# Patient Record
(demographics unavailable — no encounter records)

---

## 2024-11-05 NOTE — HEALTH RISK ASSESSMENT
[Good] : ~his/her~  mood as  good [No] : No [No falls in past year] : Patient reported no falls in the past year [0] : 2) Feeling down, depressed, or hopeless: Not at all (0) [PHQ-2 Negative - No further assessment needed] : PHQ-2 Negative - No further assessment needed [[id number]] : 0 [Never] : Never

## 2024-11-05 NOTE — HISTORY OF PRESENT ILLNESS
[de-identified] : This is annual Preventative examination for this 51 year  old female.  The patient has been generally feeling well with no new complaints  A complete evaluation of their current medication was reviewed with them including OTC medication .  Chronic medical problems for which they are being followed by a physician are:     NONE  Exercises regularly:  A complete Review of Systems is below but it is noteworthy to mention that this patient denies Chest Pain, Dyspnea on Exertion, Palpitations, urinary problems, rectal bleeding or Gastrointestinal problems at this time.

## 2024-11-05 NOTE — ASSESSMENT
[FreeTextEntry1] : This is a 51 year -old  F who was examined today for an annual preventative physical.  A complete history and physical examination were performed.  The patient seems to follow a healthy lifestyle in that she  follows a good diet and exercises regularly.    Physical examination was entirely within normal limits , including a normal blood pressure and pulse.     The following recommendations were made: Exercise regularly. Maintain a healthy diet. _____________________________________________________

## 2025-05-22 NOTE — PHYSICAL EXAM
[Soft] : abdomen soft [Non Tender] : non-tender [Non-distended] : non-distended [No HSM] : no HSM [Normal Bowel Sounds] : normal bowel sounds [No Hernias] : no hernias [Normal] : soft, non-tender, non-distended, no masses palpated, no HSM and normal bowel sounds

## 2025-05-22 NOTE — ASSESSMENT
[FreeTextEntry1] : This is a 52-year-old patient who is well until her travel to [facility] 3 to 4 weeks ago and recently developed watery diarrhea without any abdominal pains fever or rectal bleeding.  She denies any recent antibiotic use.  Her blood pressure and physical examination were normal there is no evidence of abdominal tenderness guarding or rebound.  She did have hyperactive bowel sounds.  My impression is that she probably has a travelers related diarrhea secondary to the Campylobacter possibly Shigella or Salmonella.  Bloods were obtained to check her electrolytes.  In addition I advised her to collect stools for C. difficile, C culture and sensitivity and parasites.  I advised that she take half an Imodium twice daily and go on a brat diet and call if she develops any fever chills rectal bleeding or fevers

## 2025-05-22 NOTE — HISTORY OF PRESENT ILLNESS
[FreeTextEntry8] : This is a 52-year-old patient who is complaining of significant watery diarrhea for 3 days duration.  The patient admits to being in [facility] approximately 3 weeks ago.  She denies any antibiotic use she denies any other symptoms that are related to COVID.  She denies any fever chills or vomiting